# Patient Record
Sex: FEMALE | Race: WHITE | NOT HISPANIC OR LATINO | URBAN - NONMETROPOLITAN AREA
[De-identification: names, ages, dates, MRNs, and addresses within clinical notes are randomized per-mention and may not be internally consistent; named-entity substitution may affect disease eponyms.]

---

## 2018-08-17 PROBLEM — H52.4: Noted: 2021-10-22

## 2018-08-17 PROBLEM — H52.13: Noted: 2018-08-17

## 2018-08-17 PROBLEM — H52.223: Noted: 2021-10-22

## 2021-10-22 ENCOUNTER — IMPORTED ENCOUNTER (OUTPATIENT)
Dept: URBAN - NONMETROPOLITAN AREA CLINIC 1 | Facility: CLINIC | Age: 41
End: 2021-10-22

## 2021-10-22 PROCEDURE — 92004 COMPRE OPH EXAM NEW PT 1/>: CPT

## 2021-10-22 PROCEDURE — 92015 DETERMINE REFRACTIVE STATE: CPT

## 2021-10-22 NOTE — PATIENT DISCUSSION
Compound Myopic Astigmatism OU w/Incipient Presbyopia-  discussed findings w/patient-  new spectacle Rx issued-  ok for patient to continue w/removing glasses for NV-  RTC 1 year or prn; 's Notes: MR 10/22/2021DFE 10/22/2021

## 2022-04-09 ASSESSMENT — VISUAL ACUITY
OD_SC: 20/25
OU_CC: 20/30
OS_SC: 20/25

## 2022-04-09 ASSESSMENT — TONOMETRY
OS_IOP_MMHG: 18
OD_IOP_MMHG: 18